# Patient Record
Sex: FEMALE | URBAN - METROPOLITAN AREA
[De-identification: names, ages, dates, MRNs, and addresses within clinical notes are randomized per-mention and may not be internally consistent; named-entity substitution may affect disease eponyms.]

---

## 2024-03-21 ENCOUNTER — APPOINTMENT (OUTPATIENT)
Dept: URBAN - METROPOLITAN AREA CLINIC 297 | Age: 34
Setting detail: DERMATOLOGY
End: 2024-03-21

## 2024-03-21 DIAGNOSIS — Z41.9 ENCOUNTER FOR PROCEDURE FOR PURPOSES OTHER THAN REMEDYING HEALTH STATE, UNSPECIFIED: ICD-10-CM

## 2024-03-21 PROCEDURE — OTHER COSMETIC CONSULTATION: BOTOX: OTHER

## 2024-03-21 PROCEDURE — OTHER COSMETIC CONSULTATION: FILLERS: OTHER

## 2024-03-21 PROCEDURE — OTHER JUVEDERM VOLBELLA INJECTION: OTHER

## 2024-03-21 PROCEDURE — OTHER BOTOX: OTHER

## 2024-03-21 NOTE — PROCEDURE: BOTOX
Dilution (U/0.1 Cc): 2.5
Additional Area 3 Units: 0
Show Forehead Units: Yes
Price (Use Numbers Only, No Special Characters Or $): 0.0
Show Ucl Units: No
Additional Area 2 Location: jelly roll
Periorbital Skin Units: 8
Additional Area 5 Location: Chin
Consent: Written consent obtained. Risks include but not limited to lid/brow ptosis, bruising, swelling, diplopia, temporary effect, incomplete chemical denervation.
Additional Area 1 Location: [facility]
Detail Level: Detailed
Incrementing Botox Units: By 0.5 Units
Post-Care Instructions: Patient instructed to not lie down for 4 hours and limit physical activity for 24 hours.
Additional Area 4 Location: [facility]
Show Inventory Tab: Show
Expiration Date (Month Year): 05/25
Glabellar Complex Units: 20
Lot #: [id number]
Additional Area 3 Location: Lip Flip

## 2024-03-21 NOTE — PROCEDURE: JUVEDERM VOLBELLA INJECTION
Additional Area 5 Volume In Cc: 0
Additional Anesthesia Volume In Cc: 6
Expiration Date (Month Year): 2023
Consent: Written consent obtained. Risks include but not limited to bruising, beading, irregular texture, ulceration, infection, allergic reaction, scar formation, incomplete augmentation, temporary nature, procedural pain.
Include Cannula Information In Note?: No
Number Of Syringes (Required For Inventory): 1
Show Inventory Tab: Show
Anesthesia Volume In Cc: 0.5
Post-Care Instructions: Patient instructed to apply ice to reduce swelling.
Lot #: [id number]
Map Statment: See Attach Map for Complete Details
Detail Level: Detailed
Anesthesia Type: 1% lidocaine with epinephrine
Filler: Juvederm Volbella XC
Procedural Text: The filler was administered to the treatment areas noted above.
Additional Area 1 Location: Perioral